# Patient Record
Sex: MALE | Race: WHITE | NOT HISPANIC OR LATINO | URBAN - METROPOLITAN AREA
[De-identification: names, ages, dates, MRNs, and addresses within clinical notes are randomized per-mention and may not be internally consistent; named-entity substitution may affect disease eponyms.]

---

## 2023-08-10 ENCOUNTER — OFFICE VISIT (OUTPATIENT)
Dept: URGENT CARE | Facility: URGENT CARE | Age: 28
End: 2023-08-10
Payer: COMMERCIAL

## 2023-08-10 VITALS
SYSTOLIC BLOOD PRESSURE: 126 MMHG | OXYGEN SATURATION: 99 % | HEART RATE: 60 BPM | TEMPERATURE: 97.9 F | WEIGHT: 184.4 LBS | DIASTOLIC BLOOD PRESSURE: 74 MMHG

## 2023-08-10 DIAGNOSIS — M62.830 BACK MUSCLE SPASM: ICD-10-CM

## 2023-08-10 DIAGNOSIS — M54.6 ACUTE BILATERAL THORACIC BACK PAIN: Primary | ICD-10-CM

## 2023-08-10 PROCEDURE — 96372 THER/PROPH/DIAG INJ SC/IM: CPT | Performed by: NURSE PRACTITIONER

## 2023-08-10 PROCEDURE — 99203 OFFICE O/P NEW LOW 30 MIN: CPT | Mod: 25 | Performed by: NURSE PRACTITIONER

## 2023-08-10 RX ORDER — KETOROLAC TROMETHAMINE 30 MG/ML
30 INJECTION, SOLUTION INTRAMUSCULAR; INTRAVENOUS ONCE
Status: COMPLETED | OUTPATIENT
Start: 2023-08-10 | End: 2023-08-10

## 2023-08-10 RX ORDER — CYCLOBENZAPRINE HCL 10 MG
5-10 TABLET ORAL 3 TIMES DAILY PRN
Qty: 30 TABLET | Refills: 0 | Status: SHIPPED | OUTPATIENT
Start: 2023-08-10 | End: 2023-08-20

## 2023-08-10 RX ORDER — IBUPROFEN 600 MG/1
600 TABLET, FILM COATED ORAL EVERY 8 HOURS
Qty: 21 TABLET | Refills: 0 | Status: SHIPPED | OUTPATIENT
Start: 2023-08-10 | End: 2023-08-17

## 2023-08-10 RX ADMIN — KETOROLAC TROMETHAMINE 30 MG: 30 INJECTION, SOLUTION INTRAMUSCULAR; INTRAVENOUS at 10:56

## 2023-08-10 NOTE — LETTER
August 10, 2023      Azael Dao  [address] [address]        To Whom It May Concern:    Azael Dao  was seen on 8/10/2023.  Please excuse him  until 8/14/2023 due to injury.        Sincerely,        WAYNE PATEL, CNP

## 2023-08-10 NOTE — PROGRESS NOTES
Chief Complaint   Patient presents with    Urgent Care     Pt presents with lower back pain onset 4 days. Pain rate 6/10         ICD-10-CM    1. Acute bilateral thoracic back pain  M54.6 ketorolac (TORADOL) injection 30 mg     ibuprofen (ADVIL/MOTRIN) 600 MG tablet      2. Back muscle spasm  M62.830 cyclobenzaprine (FLEXERIL) 10 MG tablet      Patient tolerated Toradol injection with no reactions after 20 minutes.  Pain had started to improve prior to discharge.  Ice, heat, ibuprofen, muscle relaxants.  Follow-up in 10 to 14 days if symptoms have not completely resolved.  Sooner if symptoms worsen.    Subjective     Azael Dao is an 28 year old male who presents to clinic today for thoracic back pain that started 4 days ago.  He did not have any specific injury but was at work doing his general duties which do not require any type of heavy lifting or straining when he started to feel his back tighten up.  Over the next couple of hours it got worse and worse.  He has been unable to work since that time.  He has been taking ibuprofen and Tylenol occasionally.  He last took ibuprofen at 5 AM this morning, 2 tablets.    He denies any numbness, tingling or shooting pain.      Objective    /74 (BP Location: Right arm, Patient Position: Supine, Cuff Size: Adult Regular)   Pulse 60   Temp 97.9  F (36.6  C) (Tympanic)   Wt 83.6 kg (184 lb 6.4 oz)   SpO2 99%   Nurses notes and VS have been reviewed.    Physical Exam       GENERAL APPEARANCE: alert and mild distress     ABDOMEN:  soft, nontender, no HSM or masses and bowel sounds normal     MS: extremities normal- no gross deformities noted; normal muscle tone.     MS: Gait is hesitant, tenderness over the bilateral thoracic musculature, no tenderness over the spinal processes     SKIN: no suspicious lesions or rashes     NEURO: Normal strength and tone, mentation intact and speech normal     PSYCH: normal thought process; no significant mood  TONA Flynn, CNP  [facility]    The use of Dragon/[employer] dictation services may have been used to construct the content in this note; any grammatical or spelling errors are non-intentional. Please contact the author of this note directly if you are in need of any clarification.

## 2023-08-10 NOTE — PATIENT INSTRUCTIONS
Do not take any ibuprofen until 7 PM this evening.    You may start the muscle relaxants anytime.    Use heat and/or ice as needed for comfort.

## 2023-08-10 NOTE — PROGRESS NOTES
Clinic Administered Medication Documentation        Patient was given Toradol. Prior to medication administration, verified patient's identity using patient s name and date of birth. Please see MAR and medication order for additional information. Patient instructed to remain in clinic for 15 minutes and report any adverse reaction to staff immediately.    Vial/Syringe: Single dose vial. Was entire vial of medication used? Yes    Hannah Esquivel CMA on 8/10/2023 at 10:57 AM

## 2025-07-01 ENCOUNTER — OFFICE VISIT (OUTPATIENT)
Dept: URGENT CARE | Facility: URGENT CARE | Age: 30
End: 2025-07-01
Payer: COMMERCIAL

## 2025-07-01 VITALS
DIASTOLIC BLOOD PRESSURE: 76 MMHG | WEIGHT: 189 LBS | BODY MASS INDEX: 26.46 KG/M2 | HEART RATE: 69 BPM | SYSTOLIC BLOOD PRESSURE: 127 MMHG | OXYGEN SATURATION: 98 % | HEIGHT: 71 IN | TEMPERATURE: 98.5 F | RESPIRATION RATE: 18 BRPM

## 2025-07-01 DIAGNOSIS — M25.50 ARTHRALGIA, UNSPECIFIED JOINT: Primary | ICD-10-CM

## 2025-07-01 LAB
ALBUMIN SERPL-MCNC: 3.9 G/DL (ref 3.4–5)
ALP SERPL-CCNC: 53 U/L (ref 40–150)
ALT SERPL W P-5'-P-CCNC: 18 U/L (ref 0–70)
ANION GAP SERPL CALCULATED.3IONS-SCNC: <1 MMOL/L (ref 3–14)
AST SERPL W P-5'-P-CCNC: 26 U/L (ref 0–45)
BASOPHILS # BLD AUTO: 0 10E3/UL (ref 0–0.2)
BASOPHILS NFR BLD AUTO: 0 %
BILIRUB SERPL-MCNC: 0.5 MG/DL (ref 0.2–1.3)
BUN SERPL-MCNC: 11 MG/DL (ref 7–30)
CALCIUM SERPL-MCNC: 9.4 MG/DL (ref 8.5–10.1)
CHLORIDE BLD-SCNC: 109 MMOL/L (ref 94–109)
CO2 SERPL-SCNC: 32 MMOL/L (ref 20–32)
CREAT SERPL-MCNC: 1 MG/DL (ref 0.66–1.25)
CRP SERPL-MCNC: <3 MG/L
DEPRECATED S PYO AG THROAT QL EIA: NEGATIVE
EGFRCR SERPLBLD CKD-EPI 2021: >90 ML/MIN/1.73M2
EOSINOPHIL # BLD AUTO: 0 10E3/UL (ref 0–0.7)
EOSINOPHIL NFR BLD AUTO: 1 %
ERYTHROCYTE [DISTWIDTH] IN BLOOD BY AUTOMATED COUNT: 12.9 % (ref 10–15)
ERYTHROCYTE [SEDIMENTATION RATE] IN BLOOD BY WESTERGREN METHOD: 7 MM/HR (ref 0–15)
GLUCOSE BLD-MCNC: 100 MG/DL (ref 70–99)
HCT VFR BLD AUTO: 41.8 % (ref 40–53)
HGB BLD-MCNC: 14.1 G/DL (ref 13.3–17.7)
IMM GRANULOCYTES # BLD: 0 10E3/UL
IMM GRANULOCYTES NFR BLD: 0 %
LYMPHOCYTES # BLD AUTO: 1.1 10E3/UL (ref 0.8–5.3)
LYMPHOCYTES NFR BLD AUTO: 34 %
MCH RBC QN AUTO: 27.5 PG (ref 26.5–33)
MCHC RBC AUTO-ENTMCNC: 33.7 G/DL (ref 31.5–36.5)
MCV RBC AUTO: 82 FL (ref 78–100)
MONOCYTES # BLD AUTO: 0.5 10E3/UL (ref 0–1.3)
MONOCYTES NFR BLD AUTO: 15 %
NEUTROPHILS # BLD AUTO: 1.6 10E3/UL (ref 1.6–8.3)
NEUTROPHILS NFR BLD AUTO: 49 %
PLATELET # BLD AUTO: 212 10E3/UL (ref 150–450)
POTASSIUM BLD-SCNC: 4.7 MMOL/L (ref 3.4–5.3)
PROT SERPL-MCNC: 7.6 G/DL (ref 6.8–8.8)
RBC # BLD AUTO: 5.13 10E6/UL (ref 4.4–5.9)
S PYO DNA THROAT QL NAA+PROBE: NOT DETECTED
SODIUM SERPL-SCNC: 141 MMOL/L (ref 135–145)
WBC # BLD AUTO: 3.2 10E3/UL (ref 4–11)

## 2025-07-01 PROCEDURE — 86666 EHRLICHIA ANTIBODY: CPT | Mod: 90 | Performed by: FAMILY MEDICINE

## 2025-07-01 PROCEDURE — 86140 C-REACTIVE PROTEIN: CPT | Performed by: FAMILY MEDICINE

## 2025-07-01 PROCEDURE — 87651 STREP A DNA AMP PROBE: CPT | Performed by: FAMILY MEDICINE

## 2025-07-01 PROCEDURE — 87468 ANAPLSMA PHGCYTOPHLM AMP PRB: CPT | Performed by: FAMILY MEDICINE

## 2025-07-01 PROCEDURE — 85652 RBC SED RATE AUTOMATED: CPT | Performed by: FAMILY MEDICINE

## 2025-07-01 PROCEDURE — 99000 SPECIMEN HANDLING OFFICE-LAB: CPT | Performed by: FAMILY MEDICINE

## 2025-07-01 PROCEDURE — 80053 COMPREHEN METABOLIC PANEL: CPT | Performed by: FAMILY MEDICINE

## 2025-07-01 PROCEDURE — 36415 COLL VENOUS BLD VENIPUNCTURE: CPT | Performed by: FAMILY MEDICINE

## 2025-07-01 PROCEDURE — 85025 COMPLETE CBC W/AUTO DIFF WBC: CPT | Performed by: FAMILY MEDICINE

## 2025-07-01 PROCEDURE — 99213 OFFICE O/P EST LOW 20 MIN: CPT | Performed by: FAMILY MEDICINE

## 2025-07-01 PROCEDURE — 86618 LYME DISEASE ANTIBODY: CPT | Performed by: FAMILY MEDICINE

## 2025-07-01 PROCEDURE — 3074F SYST BP LT 130 MM HG: CPT | Performed by: FAMILY MEDICINE

## 2025-07-01 PROCEDURE — 3078F DIAST BP <80 MM HG: CPT | Performed by: FAMILY MEDICINE

## 2025-07-01 PROCEDURE — 87798 DETECT AGENT NOS DNA AMP: CPT | Performed by: FAMILY MEDICINE

## 2025-07-01 NOTE — PROGRESS NOTES
"SUBJECTIVE: Azael Dao is a 30 year old male presenting with a chief complaint of joint pain 3 days, neg home covid.  Has been camping and c/o possible lyme dz    No past medical history on file.  Allergies   Allergen Reactions    Seasonal Allergies      Dust, ragweed     Social History     Tobacco Use    Smoking status: Never    Smokeless tobacco: Never    Tobacco comments:     non smoking home   Substance Use Topics    Alcohol use: Yes       ROS:  SKIN: no rash  GI: no vomiting    OBJECTIVE:  /76 (BP Location: Left arm, Patient Position: Sitting, Cuff Size: Adult Regular)   Pulse 69   Temp 98.5  F (36.9  C) (Tympanic)   Resp 18   Ht 1.803 m (5' 11\")   Wt 85.7 kg (189 lb)   SpO2 98%   BMI 26.36 kg/m  GENERAL APPEARANCE: healthy, alert and no distress  EYES: EOMI,  PERRL, conjunctiva clear  HENT: ear canals and TM's normal.  Nose and mouth without ulcers, erythema or lesions  RESP: lungs clear to auscultation - no rales, rhonchi or wheezes  CV: regular rates and rhythm, normal S1 S2, no murmur noted  ABDOMEN:  soft, nontender, no HSM or masses and bowel sounds normal  SKIN: no suspicious lesions or rashes      ICD-10-CM    1. Arthralgia, unspecified joint  M25.50 CBC with Platelets & Differential     Comprehensive metabolic panel     Streptococcus A Rapid Screen w/Reflex to PCR - Clinic Collect     LYME DISEASE TOTAL ANTIBODIES WITH REFLEX TO CONFIRMATION     ESR: Erythrocyte sedimentation rate     CRP, inflammation     Anaplasma phagocytoph antibody IgG IgM     Tick-Borne Disease Panel (Non-Lyme) by PCR     Group A Streptococcus PCR Throat Swab        Suspect viral syndrome with low wbc.  Symptomatic tx  Fluids/Rest, f/u if worse/not any better    "

## 2025-07-01 NOTE — PROGRESS NOTES
Urgent Care Clinic Visit    Chief Complaint   Patient presents with    Joint Pain     Patient here with complaint of all over joint pain that started on Saturday. States he also noticed chills at time and swollen glands on the left side of neck.                7/1/2025    10:04 AM   Additional Questions   Roomed by Nahomi Wilkerson MA   Accompanied by self

## 2025-07-02 ENCOUNTER — RESULTS FOLLOW-UP (OUTPATIENT)
Dept: URGENT CARE | Facility: URGENT CARE | Age: 30
End: 2025-07-02

## 2025-07-02 LAB
A PHAGOCYTOPH DNA BLD QL NAA+PROBE: NOT DETECTED
B BURGDOR IGG+IGM SER QL: 0.1
BABESIA DNA BLD QL NAA+PROBE: NOT DETECTED
EHRLICHIA DNA SPEC QL NAA+PROBE: NOT DETECTED

## 2025-07-03 LAB
A PHAGOCYTOPH IGG TITR SER IF: NORMAL {TITER}
A PHAGOCYTOPH IGM TITR SER IF: NORMAL {TITER}